# Patient Record
Sex: MALE | Race: OTHER | HISPANIC OR LATINO
[De-identification: names, ages, dates, MRNs, and addresses within clinical notes are randomized per-mention and may not be internally consistent; named-entity substitution may affect disease eponyms.]

---

## 2021-12-14 ENCOUNTER — NON-APPOINTMENT (OUTPATIENT)
Age: 36
End: 2021-12-14

## 2021-12-15 ENCOUNTER — APPOINTMENT (OUTPATIENT)
Dept: FAMILY MEDICINE | Facility: CLINIC | Age: 36
End: 2021-12-15
Payer: COMMERCIAL

## 2021-12-15 VITALS
WEIGHT: 309 LBS | BODY MASS INDEX: 44.24 KG/M2 | HEART RATE: 86 BPM | DIASTOLIC BLOOD PRESSURE: 80 MMHG | TEMPERATURE: 97.3 F | HEIGHT: 70 IN | OXYGEN SATURATION: 97 % | SYSTOLIC BLOOD PRESSURE: 128 MMHG

## 2021-12-15 PROCEDURE — G0442 ANNUAL ALCOHOL SCREEN 15 MIN: CPT

## 2021-12-15 PROCEDURE — 93000 ELECTROCARDIOGRAM COMPLETE: CPT | Mod: 59

## 2021-12-15 PROCEDURE — G0444 DEPRESSION SCREEN ANNUAL: CPT | Mod: 59

## 2021-12-15 PROCEDURE — 99205 OFFICE O/P NEW HI 60 MIN: CPT | Mod: 25

## 2021-12-16 LAB
ALBUMIN SERPL ELPH-MCNC: 4.3 G/DL
ALP BLD-CCNC: 80 U/L
ALT SERPL-CCNC: 23 U/L
ANION GAP SERPL CALC-SCNC: 10 MMOL/L
AST SERPL-CCNC: 18 U/L
BASOPHILS # BLD AUTO: 0.03 K/UL
BASOPHILS NFR BLD AUTO: 0.5 %
BILIRUB SERPL-MCNC: 0.3 MG/DL
BUN SERPL-MCNC: 13 MG/DL
CALCIUM SERPL-MCNC: 9.7 MG/DL
CHLORIDE SERPL-SCNC: 105 MMOL/L
CHOLEST SERPL-MCNC: 156 MG/DL
CO2 SERPL-SCNC: 24 MMOL/L
CREAT SERPL-MCNC: 1.01 MG/DL
EOSINOPHIL # BLD AUTO: 0.16 K/UL
EOSINOPHIL NFR BLD AUTO: 2.6 %
ESTIMATED AVERAGE GLUCOSE: 126 MG/DL
GLUCOSE SERPL-MCNC: 119 MG/DL
HBA1C MFR BLD HPLC: 6 %
HCT VFR BLD CALC: 46.9 %
HDLC SERPL-MCNC: 35 MG/DL
HGB BLD-MCNC: 14.4 G/DL
IMM GRANULOCYTES NFR BLD AUTO: 0.3 %
LDLC SERPL CALC-MCNC: 99 MG/DL
LYMPHOCYTES # BLD AUTO: 1.42 K/UL
LYMPHOCYTES NFR BLD AUTO: 22.8 %
MAN DIFF?: NORMAL
MCHC RBC-ENTMCNC: 27.9 PG
MCHC RBC-ENTMCNC: 30.7 GM/DL
MCV RBC AUTO: 90.9 FL
MONOCYTES # BLD AUTO: 0.56 K/UL
MONOCYTES NFR BLD AUTO: 9 %
NEUTROPHILS # BLD AUTO: 4.05 K/UL
NEUTROPHILS NFR BLD AUTO: 64.8 %
NONHDLC SERPL-MCNC: 121 MG/DL
PLATELET # BLD AUTO: 421 K/UL
POTASSIUM SERPL-SCNC: 4.2 MMOL/L
PROT SERPL-MCNC: 7.7 G/DL
RBC # BLD: 5.16 M/UL
RBC # FLD: 15 %
SODIUM SERPL-SCNC: 140 MMOL/L
TRIGL SERPL-MCNC: 112 MG/DL
TSH SERPL-ACNC: 2.04 UIU/ML
WBC # FLD AUTO: 6.24 K/UL
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
## 2021-12-16 NOTE — HEALTH RISK ASSESSMENT
[Never] : Never [Yes] : Yes [1 or 2 (0 pts)] : 1 or 2 (0 points) [Never (0 pts)] : Never (0 points) [No falls in past year] : Patient reported no falls in the past year [0] : 2) Feeling down, depressed, or hopeless: Not at all (0) [Very Good] : ~his/her~ current health as very good [Good] : ~his/her~  mood as  good [HIV test declined] : HIV test declined [Hepatitis C test declined] : Hepatitis C test declined [de-identified] : ER visit - 6/6/2021 [de-identified] : SOCIALLY [[id number]] : 0

## 2021-12-20 ENCOUNTER — APPOINTMENT (OUTPATIENT)
Dept: PULMONOLOGY | Facility: CLINIC | Age: 36
End: 2021-12-20
Payer: COMMERCIAL

## 2021-12-20 ENCOUNTER — APPOINTMENT (OUTPATIENT)
Dept: FAMILY MEDICINE | Facility: CLINIC | Age: 36
End: 2021-12-20

## 2021-12-20 ENCOUNTER — NON-APPOINTMENT (OUTPATIENT)
Age: 36
End: 2021-12-20

## 2021-12-20 VITALS
DIASTOLIC BLOOD PRESSURE: 80 MMHG | SYSTOLIC BLOOD PRESSURE: 120 MMHG | WEIGHT: 309 LBS | OXYGEN SATURATION: 99 % | BODY MASS INDEX: 44.24 KG/M2 | HEART RATE: 75 BPM | HEIGHT: 70 IN | TEMPERATURE: 97.2 F

## 2021-12-20 PROCEDURE — 99204 OFFICE O/P NEW MOD 45 MIN: CPT

## 2021-12-20 NOTE — ASSESSMENT
[FreeTextEntry1] : Patient has a combination of mild sleep apnea and insufficient sleep syndrome. I feel a trial of CPAP 14 cm is indicated. However the patient is advised that he needs to try to get more sleep. He should try to sleep for 7 hours per day. We'll arrange for CPAP. Patient is to return in 2 months after starting CPAP.

## 2021-12-20 NOTE — PHYSICAL EXAM
[No Acute Distress] : no acute distress [Well Nourished] : well nourished [Well Developed] : well developed [Well-Appearing] : well-appearing [Normal Sclera/Conjunctiva] : normal sclera/conjunctiva [PERRL] : pupils equal round and reactive to light [EOMI] : extraocular movements intact [Normal Outer Ear/Nose] : the outer ears and nose were normal in appearance [No JVD] : no jugular venous distention [No Lymphadenopathy] : no lymphadenopathy [Supple] : supple [Thyroid Normal, No Nodules] : the thyroid was normal and there were no nodules present [No Respiratory Distress] : no respiratory distress  [No Accessory Muscle Use] : no accessory muscle use [Clear to Auscultation] : lungs were clear to auscultation bilaterally [Normal Rate] : normal rate  [Regular Rhythm] : with a regular rhythm [Normal S1, S2] : normal S1 and S2 [No Murmur] : no murmur heard [No Carotid Bruits] : no carotid bruits [No Abdominal Bruit] : a ~M bruit was not heard ~T in the abdomen [No Varicosities] : no varicosities [Pedal Pulses Present] : the pedal pulses are present [No Edema] : there was no peripheral edema [No Palpable Aorta] : no palpable aorta [No Extremity Clubbing/Cyanosis] : no extremity clubbing/cyanosis [Soft] : abdomen soft [Non Tender] : non-tender [Non-distended] : non-distended [No Masses] : no abdominal mass palpated [No HSM] : no HSM [Normal Bowel Sounds] : normal bowel sounds [Normal Posterior Cervical Nodes] : no posterior cervical lymphadenopathy [Normal Anterior Cervical Nodes] : no anterior cervical lymphadenopathy [No CVA Tenderness] : no CVA  tenderness [No Spinal Tenderness] : no spinal tenderness [No Joint Swelling] : no joint swelling [Grossly Normal Strength/Tone] : grossly normal strength/tone [No Rash] : no rash [Coordination Grossly Intact] : coordination grossly intact [No Focal Deficits] : no focal deficits [Normal Gait] : normal gait [Normal Affect] : the affect was normal [Normal Insight/Judgement] : insight and judgment were intact [de-identified] : mallampati 3

## 2021-12-20 NOTE — HISTORY OF PRESENT ILLNESS
[FreeTextEntry1] : Evaluate sleep disorder. [de-identified] : This is a 36-year-old male who was in [facility]. He gives a history of loud snoring and witnessed apnea according to his girlfriend. Patient works different shifts over the time and his sleep schedule is very irregular due to work. Sometimes he does not sleep at all and occasionally will sleep up to 7 hours. However frequently he only gets 3-4 hours sleep a day. He feels very sleepy when he only gets limited amounts of sleep. He had a sleep study on 9-20 of this year. AHI was 9.5. He had a CPAP titration study and he was titrated to a pressure of 14 cm. He states he felt great after the CPAP titration study. He would like to try CPAP to see if he is less sleepy with CPAP. However his current [place] score is only 3. He occasionally takes melatonin prior to going to sleep. Again his sleep and is extremely variable because of different hours he is working and because of the long hours he does work. He has no other medical problems and is a nonsmoker nondrinker. He is 5 feet 10 weight 309.

## 2021-12-29 ENCOUNTER — APPOINTMENT (OUTPATIENT)
Dept: NEUROLOGY | Facility: CLINIC | Age: 36
End: 2021-12-29
Payer: COMMERCIAL

## 2021-12-29 DIAGNOSIS — Z83.49 FAMILY HISTORY OF OTHER ENDOCRINE, NUTRITIONAL AND METABOLIC DISEASES: ICD-10-CM

## 2021-12-29 PROCEDURE — 99204 OFFICE O/P NEW MOD 45 MIN: CPT | Mod: 95
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
## 2021-12-29 NOTE — ASSESSMENT
[FreeTextEntry1] : Routine and ambulatory EEG \par \par -Discussed importance of getting at least 7 hours of sleep a night . Discussed importance of compliance with CPAP. Untreated sleep apnea will put him at risk for pulmonary disease, heart disease and stroke. \par \par Discussed importance of getting COVID booster and maintaining social distancing, where mask \par \par Follow-up in four months (in person visit)\par
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
## 2021-12-29 NOTE — PHYSICAL EXAM
[FreeTextEntry1] : Telehealth visit: \par Alert, oriented. Speech fluent. Euthymic affect. \par EOMI\par Face activates symmetrically\par No clinical events concerning for seizure  \par

## 2022-01-05 ENCOUNTER — APPOINTMENT (OUTPATIENT)
Dept: FAMILY MEDICINE | Facility: CLINIC | Age: 37
End: 2022-01-05
Payer: COMMERCIAL

## 2022-01-05 VITALS
SYSTOLIC BLOOD PRESSURE: 118 MMHG | OXYGEN SATURATION: 98 % | TEMPERATURE: 97.6 F | BODY MASS INDEX: 44.24 KG/M2 | DIASTOLIC BLOOD PRESSURE: 82 MMHG | HEART RATE: 78 BPM | WEIGHT: 309 LBS | HEIGHT: 70 IN

## 2022-01-05 DIAGNOSIS — Z13.1 ENCOUNTER FOR SCREENING FOR DIABETES MELLITUS: ICD-10-CM

## 2022-01-05 DIAGNOSIS — Z13.220 ENCOUNTER FOR SCREENING FOR LIPOID DISORDERS: ICD-10-CM

## 2022-01-05 DIAGNOSIS — Z13.29 ENCOUNTER FOR SCREENING FOR OTHER SUSPECTED ENDOCRINE DISORDER: ICD-10-CM

## 2022-01-05 PROCEDURE — 99215 OFFICE O/P EST HI 40 MIN: CPT

## 2022-01-06 PROBLEM — Z13.220 SCREENING CHOLESTEROL LEVEL: Status: RESOLVED | Noted: 2021-12-15 | Resolved: 2022-01-06

## 2022-01-06 PROBLEM — Z13.1 SCREENING FOR DIABETES MELLITUS: Status: RESOLVED | Noted: 2021-12-15 | Resolved: 2022-01-06

## 2022-01-06 PROBLEM — Z13.29 SCREENING FOR THYROID DISORDER: Status: RESOLVED | Noted: 2021-12-15 | Resolved: 2022-01-06
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
## 2022-01-14 ENCOUNTER — NON-APPOINTMENT (OUTPATIENT)
Age: 37
End: 2022-01-14

## 2022-01-14 ENCOUNTER — APPOINTMENT (OUTPATIENT)
Dept: CARDIOLOGY | Facility: CLINIC | Age: 37
End: 2022-01-14
Payer: COMMERCIAL

## 2022-01-14 VITALS
SYSTOLIC BLOOD PRESSURE: 128 MMHG | BODY MASS INDEX: 45.05 KG/M2 | RESPIRATION RATE: 12 BRPM | HEART RATE: 69 BPM | WEIGHT: 314 LBS | OXYGEN SATURATION: 100 % | DIASTOLIC BLOOD PRESSURE: 90 MMHG

## 2022-01-14 DIAGNOSIS — F17.290 NICOTINE DEPENDENCE, OTHER TOBACCO PRODUCT, UNCOMPLICATED: ICD-10-CM

## 2022-01-14 DIAGNOSIS — Z72.89 OTHER PROBLEMS RELATED TO LIFESTYLE: ICD-10-CM

## 2022-01-14 DIAGNOSIS — Z82.49 FAMILY HISTORY OF ISCHEMIC HEART DISEASE AND OTHER DISEASES OF THE CIRCULATORY SYSTEM: ICD-10-CM

## 2022-01-14 DIAGNOSIS — Z87.828 PERSONAL HISTORY OF OTHER (HEALED) PHYSICAL INJURY AND TRAUMA: ICD-10-CM

## 2022-01-14 PROCEDURE — 93000 ELECTROCARDIOGRAM COMPLETE: CPT

## 2022-01-14 PROCEDURE — 99204 OFFICE O/P NEW MOD 45 MIN: CPT

## 2022-01-15 PROBLEM — Z82.49 FAMILY HISTORY OF MYOCARDIAL INFARCTION: Status: ACTIVE | Noted: 2022-01-15

## 2022-01-15 PROBLEM — Z87.828 HISTORY OF MOTOR VEHICLE ACCIDENT: Status: RESOLVED | Noted: 2021-12-16 | Resolved: 2022-01-15

## 2022-01-15 PROBLEM — F17.290 CIGAR SMOKER: Status: ACTIVE | Noted: 2022-01-15

## 2022-01-15 PROBLEM — Z82.49 FAMILY HISTORY OF ESSENTIAL HYPERTENSION: Status: ACTIVE | Noted: 2022-01-15

## 2022-01-15 PROBLEM — Z72.89 ALCOHOL USE: Status: ACTIVE | Noted: 2022-01-15

## 2022-01-15 NOTE — ASSESSMENT
[FreeTextEntry1] : 37 yo male with obesity, GERMAN (pending CPAP) with intermittent exertional dyspnea and chest discomfort.\par ECG today demonstrated sinus rhythm with low voltage QRS.\par Labs from 12/2021 were reviewed today and found to be unremarkable.\par \par Will perform echocardiogram to assess LV function and structural heart disease.\par WIll perform treadmill stress ECG for ischemic evaluation/risk stratification.\par After review of test results, will determine if further cardiac work-up or intervention is clinically indicated.\par

## 2022-01-15 NOTE — PHYSICAL EXAM
[Well Developed] : well developed [No Acute Distress] : no acute distress [Obese] : obese [Normal Conjunctiva] : normal conjunctiva [Normal] : clear lung fields, good air entry, no respiratory distress [Normal Gait] : normal gait [No Edema] : no edema [No Cyanosis] : no cyanosis [No Clubbing] : no clubbing [Moves all extremities] : moves all extremities [No Focal Deficits] : no focal deficits [Alert and Oriented] : alert and oriented [de-identified] : No JVD

## 2022-01-15 NOTE — HISTORY OF PRESENT ILLNESS
[FreeTextEntry1] : 35 yo male with obesity and GERMAN, who presents today for cardiac evaluation of intermittent exertional dyspnea and chest discomfort, which he attributes to his weight. He does not exercise regularly. Patient denies palpitations, syncope, edema, melena, hematochezia, or hematemesis. He denies prior cardiac testing other than ECGs.\par \par PMD: Sobeida Knutson\par

## 2022-01-31 ENCOUNTER — APPOINTMENT (OUTPATIENT)
Dept: CARDIOLOGY | Facility: CLINIC | Age: 37
End: 2022-01-31
Payer: COMMERCIAL

## 2022-01-31 PROCEDURE — 93306 TTE W/DOPPLER COMPLETE: CPT

## 2022-02-03 ENCOUNTER — APPOINTMENT (OUTPATIENT)
Dept: CARDIOLOGY | Facility: CLINIC | Age: 37
End: 2022-02-03
Payer: COMMERCIAL

## 2022-02-03 PROCEDURE — 93015 CV STRESS TEST SUPVJ I&R: CPT

## 2022-03-01 ENCOUNTER — APPOINTMENT (OUTPATIENT)
Dept: NEUROLOGY | Facility: CLINIC | Age: 37
End: 2022-03-01

## 2022-03-02 ENCOUNTER — APPOINTMENT (OUTPATIENT)
Dept: NEUROLOGY | Facility: CLINIC | Age: 37
End: 2022-03-02

## 2022-03-07 ENCOUNTER — APPOINTMENT (OUTPATIENT)
Dept: NEUROLOGY | Facility: CLINIC | Age: 37
End: 2022-03-07
Payer: COMMERCIAL

## 2022-03-07 PROCEDURE — 95819 EEG AWAKE AND ASLEEP: CPT

## 2022-03-08 ENCOUNTER — APPOINTMENT (OUTPATIENT)
Dept: NEUROLOGY | Facility: CLINIC | Age: 37
End: 2022-03-08

## 2022-03-08 PROCEDURE — 95708 EEG WO VID EA 12-26HR UNMNTR: CPT

## 2022-03-08 PROCEDURE — 95719 EEG PHYS/QHP EA INCR W/O VID: CPT

## 2022-03-08 PROCEDURE — 95700 EEG CONT REC W/VID EEG TECH: CPT

## 2023-02-01 ENCOUNTER — APPOINTMENT (OUTPATIENT)
Dept: FAMILY MEDICINE | Facility: CLINIC | Age: 38
End: 2023-02-01
Payer: COMMERCIAL

## 2023-02-01 VITALS
SYSTOLIC BLOOD PRESSURE: 132 MMHG | RESPIRATION RATE: 16 BRPM | OXYGEN SATURATION: 98 % | BODY MASS INDEX: 45.1 KG/M2 | DIASTOLIC BLOOD PRESSURE: 80 MMHG | HEIGHT: 70 IN | HEART RATE: 80 BPM | WEIGHT: 315 LBS | TEMPERATURE: 98 F

## 2023-02-01 DIAGNOSIS — G47.9 SLEEP DISORDER, UNSPECIFIED: ICD-10-CM

## 2023-02-01 DIAGNOSIS — R06.02 SHORTNESS OF BREATH: ICD-10-CM

## 2023-02-01 DIAGNOSIS — Z87.898 PERSONAL HISTORY OF OTHER SPECIFIED CONDITIONS: ICD-10-CM

## 2023-02-01 DIAGNOSIS — Z76.89 PERSONS ENCOUNTERING HEALTH SERVICES IN OTHER SPECIFIED CIRCUMSTANCES: ICD-10-CM

## 2023-02-01 PROCEDURE — 99385 PREV VISIT NEW AGE 18-39: CPT | Mod: 25

## 2023-02-01 PROCEDURE — G0444 DEPRESSION SCREEN ANNUAL: CPT | Mod: 59

## 2023-02-01 PROCEDURE — 99395 PREV VISIT EST AGE 18-39: CPT | Mod: 25

## 2023-02-01 NOTE — PHYSICAL EXAM
[No Acute Distress] : no acute distress [Normal Oropharynx] : the oropharynx was normal [Normal TMs] : both tympanic membranes were normal [Thyroid Normal, No Nodules] : the thyroid was normal and there were no nodules present [Clear to Auscultation] : lungs were clear to auscultation bilaterally [No Edema] : there was no peripheral edema [Normal] : affect was normal and insight and judgment were intact [de-identified] : Obese male [de-identified] : nasal congestion

## 2023-02-01 NOTE — HEALTH RISK ASSESSMENT
[Yes] : Yes [1 or 2 (0 pts)] : 1 or 2 (0 points) [Never (0 pts)] : Never (0 points) [No falls in past year] : Patient reported no falls in the past year [0] : 2) Feeling down, depressed, or hopeless: Not at all (0) [Never] : Never [PHQ-2 Negative - No further assessment needed] : PHQ-2 Negative - No further assessment needed [de-identified] : Socially [[id number]] : 0

## 2023-02-01 NOTE — REVIEW OF SYSTEMS
[Recent Change In Weight] : ~T recent weight change [Negative] : Psychiatric [Vision Problems] : no vision problems [FreeTextEntry9] : abdominal cramps occasionally

## 2023-02-01 NOTE — HISTORY OF PRESENT ILLNESS
[FreeTextEntry1] : Annual [de-identified] : Mr. ELVIRA HERNANDEZ is a 37 year old male here today for his annual.\par Only one Covid vaccination\par No flu shot\par DICK: no\par \par \par \par \par \par \par \par \par \par \par

## 2023-02-04 LAB
CHOLEST SERPL-MCNC: 154 MG/DL
ESTIMATED AVERAGE GLUCOSE: 123 MG/DL
HBA1C MFR BLD HPLC: 5.9 %
HDLC SERPL-MCNC: 37 MG/DL
LDLC SERPL CALC-MCNC: 99 MG/DL
NONHDLC SERPL-MCNC: 116 MG/DL
T3 SERPL-MCNC: 125 NG/DL
T4 SERPL-MCNC: 6.6 UG/DL
TRIGL SERPL-MCNC: 86 MG/DL
TSH SERPL-ACNC: 1.95 UIU/ML

## 2023-04-20 ENCOUNTER — APPOINTMENT (OUTPATIENT)
Dept: BARIATRICS/WEIGHT MGMT | Facility: CLINIC | Age: 38
End: 2023-04-20
Payer: COMMERCIAL

## 2023-04-20 ENCOUNTER — NON-APPOINTMENT (OUTPATIENT)
Age: 38
End: 2023-04-20

## 2023-04-20 VITALS
HEIGHT: 70 IN | DIASTOLIC BLOOD PRESSURE: 78 MMHG | WEIGHT: 315 LBS | OXYGEN SATURATION: 97 % | HEART RATE: 82 BPM | SYSTOLIC BLOOD PRESSURE: 132 MMHG | BODY MASS INDEX: 45.1 KG/M2 | RESPIRATION RATE: 16 BRPM

## 2023-04-20 PROCEDURE — 99205 OFFICE O/P NEW HI 60 MIN: CPT

## 2023-04-20 NOTE — ASSESSMENT
[FreeTextEntry1] : 37M PMH class III obesity s/p sleeve gastrectomy (~2016), low HDL, GERMAN, pre-DM who presents to weight management for initial evaluation.\par \par # Class III obesity s/p sleeve gastrectomy w/metabolic syndrome (central adiposity, low HDL, insulin resistance) c/b GERMAN: Weight today 322 lbs, BMI 46.26, lost ~70 lbs post surgery and has regained ~100 lbs over the past couple of years. Has cut out soda, sweets, and done some intermittent fasting. Is sedentary. GERMAN on CPAP, irregular sleep schedule, high dose melatonin.\par - Food log\par - Whole foods, focus on protein and vegetables\par - Eliminate liquid kcal, limit added sugars/fats\par - Add activity, will track steps, add resistance exercise.\par - C/w CPAP, may consider assessment to readjust settings if needed. His sleep schedule is irregular due to his job. I recommend not having caffeine at night. Aim to reduce melatonin (start from 24 mg down to 12 mg), it may not be the best medication since it is better for regulating circadian rhythm than as a sleeping pill and his schedule is irregular from his job. Trial adding Mg-Glycinate. Topiramate may also help. Aim for minimum 6 hours.\par - Will test micronutrients, since he has not had any bariatric surgery follow-up since his surgery ~7-8 years ago.\par - Defers bariatric dietician.\par - Medications discussed, would benefit from incretin therapy, suspect no coverage. Given post-surgical status will use Phentermine / Topiramate, latter may also help with sleep

## 2023-04-20 NOTE — HISTORY OF PRESENT ILLNESS
[FreeTextEntry1] : 37M PMH class III obesity s/p sleeve gastrectomy (~2016), low HDL, GERMAN, pre-DM who presents to weight management for initial evaluation.\par \par He presents referred by his wife, Janice Parsons. \par \par Weight/Diet History:\par Had sleeve gastrectomy in ~2016.\par max weight presurgical 304 lbs, dropped to 290 lbs pre-op, lost to 236 lbs.\par He has never seen a bariatric dietician, has not seen his surgeon since shortly after surgery. \par Weight started increasing ~2019 when he started dating his wife, he especially gained weight in 2021. \par no sweets, soda, but less exercise\par Has done intermittent fasting, 16:8, 2pm to 10 pm.\par \par Weight today: 322 lbs, BMI 46.26\par \par Diet: when he eats varies by his schedule changing. Feels he doesn't eat a lot. \par B: Hardboiled egg\par L: meatloaf and brussels sprouts\par D:\par Dessert:\par Snack:\par Beverages: Cut out soda. Feels he drinks little water. Some coffee. Drinks tea (ovi gray black, or otto green) each night.\par Night-time eating:\par Binging: \par Fast-food/Restaurants: restaurants 3x/wk. Sometimes pizza\par Cook/Prepare meals:\par Added oils:\par Food Journal: no\par \par Exercise: Tries to exercise on weekends, go [place], gets to the gym sometimes.\par \par Sleep: Has GERMAN, uses CPAP. Sometimes helps but feels tired. Takes melatonin to fall asleep (believes total 24 mg). Inconsistent timing. Estimates 5-6 hours.\par \par Social Hx: Works as [employer] , lives with his wife. \par \par Denies any family history of thyroid cancer

## 2023-04-20 NOTE — PHYSICAL EXAM
[Obese, well nourished, in no acute distress] : obese, well nourished, in no acute distress [Normal] : affect appropriate [de-identified] : T

## 2023-05-05 ENCOUNTER — NON-APPOINTMENT (OUTPATIENT)
Age: 38
End: 2023-05-05

## 2023-05-05 LAB
25(OH)D3 SERPL-MCNC: 24.3 NG/ML
A-TOCOPHEROL VIT E SERPL-MCNC: 8 MG/L
BETA+GAMMA TOCOPHEROL SERPL-MCNC: 1.1 MG/L
COPPER SERPL-MCNC: 130 UG/DL
FERRITIN SERPL-MCNC: 47 NG/ML
FOLATE SERPL-MCNC: 9.9 NG/ML
MENADIONE SERPL-MCNC: 0.23 NG/ML
VIT A SERPL-MCNC: 36.1 UG/DL
VIT B1 SERPL-MCNC: 140.9 NMOL/L
VIT B12 SERPL-MCNC: 505 PG/ML
VIT B2 SERPL-MCNC: 264 UG/L
ZINC SERPL-MCNC: 74 UG/DL

## 2023-05-25 ENCOUNTER — APPOINTMENT (OUTPATIENT)
Dept: BARIATRICS/WEIGHT MGMT | Facility: CLINIC | Age: 38
End: 2023-05-25
Payer: COMMERCIAL

## 2023-05-25 VITALS
DIASTOLIC BLOOD PRESSURE: 81 MMHG | OXYGEN SATURATION: 95 % | HEART RATE: 69 BPM | RESPIRATION RATE: 16 BRPM | SYSTOLIC BLOOD PRESSURE: 126 MMHG | WEIGHT: 315 LBS | HEIGHT: 70 IN | BODY MASS INDEX: 45.1 KG/M2

## 2023-05-25 PROCEDURE — 99215 OFFICE O/P EST HI 40 MIN: CPT

## 2023-05-25 NOTE — ASSESSMENT
[FreeTextEntry1] : 37M [facility]  w/ PMH class III obesity s/p sleeve gastrectomy (~2016), low HDL, GERMAN, pre-DM who presents to weight management for follow-up.\par \par # Class III obesity s/p sleeve gastrectomy w/metabolic syndrome (central adiposity, low HDL, insulin resistance) c/b GERMAN: Weight today 323 lbs, BMI 46.35, weight has remained steady on Phentermine 15 mg/d, although not weight loss, this is positive since he has gained ~100 lbs over the past few years. Reports significantly reduced appetite with low dose phentermine, no side effects. Has not yet started the topiramate. Sedentary lifestyle. Sleep disrupted, adherent to CPAP. Micronutrient deficiency screening normal.\par - Food log\par - Whole foods, focus on protein and vegetables\par - Eliminate liquid kcal, limit added sugars/fats\par - Add activity, will track steps, add resistance exercise.\par - C/w CPAP, may consider assessment to readjust settings if needed. His sleep schedule is irregular due to his job. I recommend not having caffeine at night. Aim to reduce melatonin (start from 24 mg down to 12 mg), it may not be the best medication since it is better for regulating circadian rhythm than as a sleeping pill and his schedule is irregular from his job. Trial adding Mg-Glycinate. Topiramate may also help. Aim for minimum 6 hours.\par - Will test micronutrients, since he has not had any bariatric surgery follow-up since his surgery ~7-8 years ago.\par - Defers bariatric dietician.\par - C/w phentermine 15 mg/d, add Topiramate as prescribed\par - F/u in 4-6 weeks\par

## 2023-05-25 NOTE — HISTORY OF PRESENT ILLNESS
[FreeTextEntry1] : 37M [facility]  w/ PMH class III obesity s/p sleeve gastrectomy (~2016), low HDL, GERMAN, pre-DM who presents to weight management for follow-up.\par \par He initially presented 4/2023 as a referral from his wife. He had a history of sleeve gastrectomy in 2016, with weight loss from 304 lbs (pre-surgical) to 290 lbs (pre-op) to 236 lbs (post-op matilde) and had subsequently gained ~100 lbs since then. He cut out sweets/soda and engaged in numerous dietary interventions without success. \par His lifestyle was sedentary. Has GERMAN on CPAP, irregular sleep schedule, used high dose melatonin.\par We discussed lifestyle and dietary measures.\par He was prescribed Phentermine + Topiramate.\par \par Weight today: 323 lbs, BMI 46.35\par Weight at Initial Visit (4/20/23): 322 lbs, BMI 46.26\par \par Medication: He is taking Phentermine 15 mg/d, has not yet started Topiramate as he did not realize which pharmacy it was sent to . \par \par Exercise: Did a mile yesterday, generally sedentary. \par \par Sleep: Has GERMAN, uses CPAP. Takes melatonin to fall asleep, schedule is irregular due to his job.\par \par Social Hx: Works as [employer] . , lives with his wife.

## 2023-07-09 PROBLEM — E66.01 MORBID OBESITY: Status: ACTIVE | Noted: 2023-02-01

## 2023-07-09 PROBLEM — Z98.84 H/O BARIATRIC SURGERY: Status: ACTIVE | Noted: 2023-04-20

## 2023-07-09 PROBLEM — E88.81 METABOLIC SYNDROME: Status: ACTIVE | Noted: 2023-04-20

## 2023-07-10 ENCOUNTER — APPOINTMENT (OUTPATIENT)
Dept: BARIATRICS/WEIGHT MGMT | Facility: CLINIC | Age: 38
End: 2023-07-10
Payer: COMMERCIAL

## 2023-07-10 DIAGNOSIS — E66.01 MORBID (SEVERE) OBESITY DUE TO EXCESS CALORIES: ICD-10-CM

## 2023-07-10 DIAGNOSIS — E88.81 METABOLIC SYNDROME: ICD-10-CM

## 2023-07-10 DIAGNOSIS — Z98.84 BARIATRIC SURGERY STATUS: ICD-10-CM

## 2023-07-10 PROCEDURE — 99214 OFFICE O/P EST MOD 30 MIN: CPT | Mod: 95

## 2023-07-10 NOTE — HISTORY OF PRESENT ILLNESS
[Home] : at home, [unfilled] , at the time of the visit. [Medical Office: ([facility])___] : at the medical office located in  [Verbal consent obtained from patient] : the patient, [unfilled] [FreeTextEntry1] : 37M [facility]  w/ PMH class III obesity s/p sleeve gastrectomy (~2016), low HDL, GERMAN, pre-DM who presents to weight management for follow-up.\par \par He initially presented 4/2023 as a referral from his wife. He had a history of sleeve gastrectomy in 2016, with weight loss from 304 lbs (pre-surgical) to 290 lbs (pre-op) to 236 lbs (post-op matilde) and had subsequently gained ~100 lbs since then. He cut out sweets/soda and engaged in numerous dietary interventions without success. \par His lifestyle was sedentary. Has GERMAN on CPAP, irregular sleep schedule, used high dose melatonin.\par We discussed lifestyle and dietary measures.\par He was prescribed Phentermine + Topiramate.\par \par Weight today: no recent weight\par Weight at last visit (5/25/23): 323 lbs, BMI 46.35\par Weight at Initial Visit (4/20/23): 322 lbs, BMI 46.26\par \par Medication: He is taking Phentermine 15 mg/d + Topiramate 50 mg QHS, no side effects.\par \par Exercise: Starting to get into a routine, goes every other day. Does a mile each time, then some weights. \par \par Sleep: Has GERMAN, uses CPAP. Takes melatonin to fall asleep, schedule is irregular due to his job. He recently started a magnesium-theanine supplement that he thinks is helping his sleep.\par

## 2023-07-10 NOTE — PHYSICAL EXAM
[Obese, well nourished, in no acute distress] : obese, well nourished, in no acute distress [de-identified] : TEB visit

## 2023-07-10 NOTE — ASSESSMENT
[FreeTextEntry1] : 37M [facility]  w/ PMH class III obesity s/p sleeve gastrectomy (~2016), low HDL, GERMAN, pre-DM who presents to weight management for follow-up.\par \par # Class III obesity s/p sleeve gastrectomy w/metabolic syndrome (central adiposity, low HDL, insulin resistance) c/b GERMAN: Weight today no recent weight, he is taking Phentermine 15 mg/d + Topiramate 50 mg QHS, no side effects. Increasing exercise, sleep is variable due to his job. States he eats 1 meal per day, minimal snacking. Limited fruit/veg in diet. \par - Food log\par - Whole foods, focus on protein and vegetables\par - Eliminate liquid kcal, limit added sugars/fats\par - Add activity, will track steps, add resistance exercise.\par - C/w CPAP, may consider assessment to readjust settings if needed. His sleep schedule is irregular due to his job. No caffeine at night. Doing well on Mg-theanine supplement instead of high-dose melatonin (his job schedule is irregular, not aligned with regular circadian rhythm). Aim for minimum 6 hours.\par - Defers bariatric dietician.\par - C/w phentermine 15 mg/d, increase Topiramate to 75 mg for 2 weeks, then 100 mg/d\par - F/u in 4-8 weeks\par

## 2023-09-13 RX ORDER — TOPIRAMATE 25 MG/1
25 TABLET, FILM COATED ORAL
Qty: 360 | Refills: 0 | Status: ACTIVE | COMMUNITY
Start: 2023-04-20 | End: 1900-01-01

## 2024-02-16 ENCOUNTER — APPOINTMENT (OUTPATIENT)
Dept: FAMILY MEDICINE | Facility: CLINIC | Age: 39
End: 2024-02-16
Payer: COMMERCIAL

## 2024-02-16 VITALS
WEIGHT: 310 LBS | HEART RATE: 86 BPM | OXYGEN SATURATION: 96 % | SYSTOLIC BLOOD PRESSURE: 120 MMHG | BODY MASS INDEX: 44.38 KG/M2 | HEIGHT: 70 IN | DIASTOLIC BLOOD PRESSURE: 80 MMHG

## 2024-02-16 DIAGNOSIS — Z23 ENCOUNTER FOR IMMUNIZATION: ICD-10-CM

## 2024-02-16 DIAGNOSIS — E66.01 MORBID (SEVERE) OBESITY DUE TO EXCESS CALORIES: ICD-10-CM

## 2024-02-16 DIAGNOSIS — G47.30 SLEEP APNEA, UNSPECIFIED: ICD-10-CM

## 2024-02-16 DIAGNOSIS — E78.6 LIPOPROTEIN DEFICIENCY: ICD-10-CM

## 2024-02-16 DIAGNOSIS — Z00.00 ENCOUNTER FOR GENERAL ADULT MEDICAL EXAMINATION W/OUT ABNORMAL FINDINGS: ICD-10-CM

## 2024-02-16 DIAGNOSIS — R73.03 PREDIABETES.: ICD-10-CM

## 2024-02-16 PROCEDURE — G0444 DEPRESSION SCREEN ANNUAL: CPT | Mod: 59

## 2024-02-16 PROCEDURE — 36415 COLL VENOUS BLD VENIPUNCTURE: CPT

## 2024-02-16 PROCEDURE — 99395 PREV VISIT EST AGE 18-39: CPT

## 2024-02-16 RX ORDER — PHENTERMINE HYDROCHLORIDE 15 MG/1
15 CAPSULE ORAL
Qty: 30 | Refills: 1 | Status: DISCONTINUED | COMMUNITY
Start: 2023-04-20 | End: 2024-02-16

## 2024-02-21 LAB
ALBUMIN SERPL ELPH-MCNC: 4.4 G/DL
ALP BLD-CCNC: 74 U/L
ALT SERPL-CCNC: 23 U/L
ANION GAP SERPL CALC-SCNC: 12 MMOL/L
AST SERPL-CCNC: 17 U/L
BILIRUB SERPL-MCNC: 0.2 MG/DL
BUN SERPL-MCNC: 13 MG/DL
CALCIUM SERPL-MCNC: 9.3 MG/DL
CHLORIDE SERPL-SCNC: 101 MMOL/L
CHOLEST SERPL-MCNC: 155 MG/DL
CO2 SERPL-SCNC: 22 MMOL/L
CREAT SERPL-MCNC: 0.89 MG/DL
EGFR: 113 ML/MIN/1.73M2
ESTIMATED AVERAGE GLUCOSE: 123 MG/DL
GLUCOSE SERPL-MCNC: 130 MG/DL
HBA1C MFR BLD HPLC: 5.9 %
HDLC SERPL-MCNC: 38 MG/DL
LDLC SERPL CALC-MCNC: 99 MG/DL
NONHDLC SERPL-MCNC: 117 MG/DL
POTASSIUM SERPL-SCNC: 4.6 MMOL/L
PROT SERPL-MCNC: 7.4 G/DL
SODIUM SERPL-SCNC: 136 MMOL/L
TRIGL SERPL-MCNC: 93 MG/DL
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
## 2024-02-22 NOTE — ASSESSMENT
[FreeTextEntry1] : 38 year old male here for annual exam. Labs drawn in office. reviewed healthy lifestyle modifications Refill obesity medication and advised followup with specialist for med management. Advised he contact CPAP company for new tubing, confirmed with pulm office this is typical approach for new parts.

## 2024-02-22 NOTE — HEALTH RISK ASSESSMENT
[Good] : ~his/her~  mood as  good [Yes] : Yes [2 - 4 times a month (2 pts)] : 2-4 times a month (2 points) [No] : In the past 12 months have you used drugs other than those required for medical reasons? No [No falls in past year] : Patient reported no falls in the past year [0] : 2) Feeling down, depressed, or hopeless: Not at all (0) [PHQ-2 Negative - No further assessment needed] : PHQ-2 Negative - No further assessment needed [Fully functional (bathing, dressing, toileting, transferring, walking, feeding)] : Fully functional (bathing, dressing, toileting, transferring, walking, feeding) [Fully functional (using the telephone, shopping, preparing meals, housekeeping, doing laundry, using] : Fully functional and needs no help or supervision to perform IADLs (using the telephone, shopping, preparing meals, housekeeping, doing laundry, using transportation, managing medications and managing finances) [Never] : Never [Employed] : employed [Sexually Active] : sexually active [FreeTextEntry1] : None [de-identified] : Tries to do resistance exercise [de-identified] : None [[id number]] : 0 [Reports changes in hearing] : Reports no changes in hearing [Reports changes in vision] : Reports no changes in vision [de-identified] : Cigars

## 2024-02-22 NOTE — PHYSICAL EXAM
[No Acute Distress] : no acute distress [Well Developed] : well developed [Coordination Grossly Intact] : coordination grossly intact [No Focal Deficits] : no focal deficits [Normal Gait] : normal gait [Alert and Oriented x3] : oriented to person, place, and time [Normal] : affect was normal and insight and judgment were intact [de-identified] : obese

## 2024-06-14 ENCOUNTER — APPOINTMENT (OUTPATIENT)
Dept: FAMILY MEDICINE | Facility: CLINIC | Age: 39
End: 2024-06-14

## 2024-12-24 PROBLEM — F10.90 ALCOHOL USE: Status: ACTIVE | Noted: 2022-01-15

## 2025-01-02 ENCOUNTER — APPOINTMENT (OUTPATIENT)
Dept: FAMILY MEDICINE | Facility: CLINIC | Age: 40
End: 2025-01-02
Payer: COMMERCIAL

## 2025-01-02 VITALS
HEART RATE: 76 BPM | DIASTOLIC BLOOD PRESSURE: 82 MMHG | OXYGEN SATURATION: 98 % | SYSTOLIC BLOOD PRESSURE: 124 MMHG | HEIGHT: 70 IN

## 2025-01-02 VITALS — HEIGHT: 70 IN | WEIGHT: 315 LBS | BODY MASS INDEX: 45.1 KG/M2

## 2025-01-02 DIAGNOSIS — G47.30 SLEEP APNEA, UNSPECIFIED: ICD-10-CM

## 2025-01-02 DIAGNOSIS — E88.810 METABOLIC SYNDROME: ICD-10-CM

## 2025-01-02 DIAGNOSIS — Z98.84 BARIATRIC SURGERY STATUS: ICD-10-CM

## 2025-01-02 DIAGNOSIS — E66.01 OBESITY, CLASS 3: ICD-10-CM

## 2025-01-02 DIAGNOSIS — E78.6 LIPOPROTEIN DEFICIENCY: ICD-10-CM

## 2025-01-02 DIAGNOSIS — R73.03 PREDIABETES.: ICD-10-CM

## 2025-01-02 DIAGNOSIS — E66.813 OBESITY, CLASS 3: ICD-10-CM

## 2025-01-02 PROCEDURE — 99214 OFFICE O/P EST MOD 30 MIN: CPT

## 2025-01-02 PROCEDURE — G2211 COMPLEX E/M VISIT ADD ON: CPT | Mod: NC

## 2025-01-03 PROBLEM — E88.810 METABOLIC SYNDROME: Status: ACTIVE | Noted: 2023-04-20

## 2025-01-03 PROBLEM — E66.813 CLASS 3 SEVERE OBESITY WITH SERIOUS COMORBIDITY IN ADULT: Status: ACTIVE | Noted: 2023-04-19

## 2025-01-06 RX ORDER — TIRZEPATIDE 2.5 MG/.5ML
2.5 INJECTION, SOLUTION SUBCUTANEOUS
Qty: 1 | Refills: 0 | Status: ACTIVE | COMMUNITY
Start: 2025-01-03 | End: 1900-01-01

## 2025-05-05 ENCOUNTER — APPOINTMENT (OUTPATIENT)
Dept: FAMILY MEDICINE | Facility: CLINIC | Age: 40
End: 2025-05-05
Payer: COMMERCIAL

## 2025-05-05 VITALS
DIASTOLIC BLOOD PRESSURE: 80 MMHG | BODY MASS INDEX: 45.1 KG/M2 | WEIGHT: 315 LBS | HEART RATE: 76 BPM | SYSTOLIC BLOOD PRESSURE: 128 MMHG | OXYGEN SATURATION: 95 % | HEIGHT: 70 IN

## 2025-05-05 DIAGNOSIS — E66.813 OBESITY, CLASS 3: ICD-10-CM

## 2025-05-05 DIAGNOSIS — E66.01 OBESITY, CLASS 3: ICD-10-CM

## 2025-05-05 DIAGNOSIS — Z76.89 PERSONS ENCOUNTERING HEALTH SERVICES IN OTHER SPECIFIED CIRCUMSTANCES: ICD-10-CM

## 2025-05-05 DIAGNOSIS — R73.03 PREDIABETES.: ICD-10-CM

## 2025-05-05 DIAGNOSIS — G47.30 SLEEP APNEA, UNSPECIFIED: ICD-10-CM

## 2025-05-05 PROCEDURE — 99214 OFFICE O/P EST MOD 30 MIN: CPT

## 2025-05-05 PROCEDURE — G2211 COMPLEX E/M VISIT ADD ON: CPT | Mod: NC

## 2025-08-25 ENCOUNTER — APPOINTMENT (OUTPATIENT)
Dept: FAMILY MEDICINE | Facility: CLINIC | Age: 40
End: 2025-08-25